# Patient Record
(demographics unavailable — no encounter records)

---

## 2025-06-23 NOTE — HISTORY OF PRESENT ILLNESS
[de-identified] : This patient presents after her recent screening mammogram showed some calcifications in the left breast in the lower aspect.  The recommendation was for short-term follow-up.  The patient denies any pain or masses in her breast.  She denies any nipple inversion or discharge.  She does report a previous breast excision on the right as well as core biopsies on the left which were benign. Breast risk factors: The patient underwent menarche at age 11 and menopause at age 52.  She has 1 child that she had in her late 30s and did not breast-feed.  She has no family history of breast cancer and has not been on any hormonal therapy.  She has had previous breast excision on the right and biopsies on the left.

## 2025-06-23 NOTE — ASSESSMENT
[FreeTextEntry1] : This patient has some likely benign calcifications in the left breast.  I did personally review her mammogram images and she does have some calcifications in the inferior aspect of the left breast.  She does have a history of benign breast biopsies and excision of a right breast cyst in the past.  At this point we plan to perform a 6-month follow-up from the last mammogram.  Will see the patient back for clinical breast exam after that and decide if any further intervention is indicated at that point.  We instructed patient if any other issues arise in the interim to call for an earlier appointment.

## 2025-06-23 NOTE — PHYSICAL EXAM
[de-identified] : She has no cervical, supraclavicular or axillary nodes.  Her breasts are symmetrical.  She has no skin changes or nipple inversion.  On the right breast in the infra areolar region she does have evidence of a previous lumpectomy.  In the left breast she does not have any palpable masses or nodules.